# Patient Record
Sex: FEMALE | Race: WHITE | Employment: UNEMPLOYED | URBAN - METROPOLITAN AREA
[De-identification: names, ages, dates, MRNs, and addresses within clinical notes are randomized per-mention and may not be internally consistent; named-entity substitution may affect disease eponyms.]

---

## 2019-10-11 LAB
HBSAG, EXTERNAL: NORMAL
HIV, EXTERNAL: NORMAL
RPR, EXTERNAL: NORMAL
RUBELLA, EXTERNAL: NORMAL

## 2020-02-28 LAB — GRBS, EXTERNAL: NEGATIVE

## 2020-03-30 ENCOUNTER — HOSPITAL ENCOUNTER (INPATIENT)
Age: 25
LOS: 2 days | Discharge: HOME | End: 2020-04-01
Attending: OBSTETRICS & GYNECOLOGY | Admitting: OBSTETRICS & GYNECOLOGY
Payer: COMMERCIAL

## 2020-03-30 DIAGNOSIS — Z34.90 ENCOUNTER FOR PLANNED INDUCTION OF LABOR: Primary | ICD-10-CM

## 2020-03-30 PROBLEM — Z3A.40 40 WEEKS GESTATION OF PREGNANCY: Status: ACTIVE | Noted: 2020-03-30

## 2020-03-30 PROBLEM — O13.3 GESTATIONAL HTN, THIRD TRIMESTER: Status: ACTIVE | Noted: 2020-03-30

## 2020-03-30 LAB
ABO + RH BLD: NORMAL
BLOOD GROUP ANTIBODIES SERPL: NORMAL
ERYTHROCYTE [DISTWIDTH] IN BLOOD BY AUTOMATED COUNT: 12.7 % (ref 11.9–14.6)
HCT VFR BLD AUTO: 35.1 % (ref 35.8–46.3)
HGB BLD-MCNC: 11.6 G/DL (ref 11.7–15.4)
MCH RBC QN AUTO: 29.5 PG (ref 26.1–32.9)
MCHC RBC AUTO-ENTMCNC: 33 G/DL (ref 31.4–35)
MCV RBC AUTO: 89.3 FL (ref 79.6–97.8)
NRBC # BLD: 0 K/UL (ref 0–0.2)
PLATELET # BLD AUTO: 173 K/UL (ref 150–450)
PMV BLD AUTO: 10.2 FL (ref 9.4–12.3)
RBC # BLD AUTO: 3.93 M/UL (ref 4.05–5.2)
SPECIMEN EXP DATE BLD: NORMAL
WBC # BLD AUTO: 13.7 K/UL (ref 4.3–11.1)

## 2020-03-30 PROCEDURE — 85027 COMPLETE CBC AUTOMATED: CPT

## 2020-03-30 PROCEDURE — 65270000029 HC RM PRIVATE

## 2020-03-30 PROCEDURE — 86900 BLOOD TYPING SEROLOGIC ABO: CPT

## 2020-03-30 RX ORDER — LIDOCAINE HYDROCHLORIDE 10 MG/ML
1 INJECTION INFILTRATION; PERINEURAL
Status: DISCONTINUED | OUTPATIENT
Start: 2020-03-30 | End: 2020-03-31 | Stop reason: HOSPADM

## 2020-03-30 RX ORDER — DEXTROSE, SODIUM CHLORIDE, SODIUM LACTATE, POTASSIUM CHLORIDE, AND CALCIUM CHLORIDE 5; .6; .31; .03; .02 G/100ML; G/100ML; G/100ML; G/100ML; G/100ML
125 INJECTION, SOLUTION INTRAVENOUS CONTINUOUS
Status: DISCONTINUED | OUTPATIENT
Start: 2020-03-30 | End: 2020-03-31

## 2020-03-30 RX ORDER — OXYTOCIN/RINGER'S LACTATE 30/500 ML
250 PLASTIC BAG, INJECTION (ML) INTRAVENOUS ONCE
Status: ACTIVE | OUTPATIENT
Start: 2020-03-30 | End: 2020-03-31

## 2020-03-30 RX ORDER — LIDOCAINE HYDROCHLORIDE 20 MG/ML
JELLY TOPICAL
Status: DISCONTINUED | OUTPATIENT
Start: 2020-03-30 | End: 2020-03-31 | Stop reason: HOSPADM

## 2020-03-30 RX ORDER — SODIUM CHLORIDE 0.9 % (FLUSH) 0.9 %
5-40 SYRINGE (ML) INJECTION EVERY 8 HOURS
Status: DISCONTINUED | OUTPATIENT
Start: 2020-03-30 | End: 2020-03-31

## 2020-03-30 RX ORDER — SODIUM CHLORIDE 0.9 % (FLUSH) 0.9 %
5-40 SYRINGE (ML) INJECTION AS NEEDED
Status: DISCONTINUED | OUTPATIENT
Start: 2020-03-30 | End: 2020-03-31

## 2020-03-30 RX ORDER — OXYTOCIN/RINGER'S LACTATE 30/500 ML
0-25 PLASTIC BAG, INJECTION (ML) INTRAVENOUS
Status: DISCONTINUED | OUTPATIENT
Start: 2020-03-30 | End: 2020-03-31 | Stop reason: HOSPADM

## 2020-03-30 RX ORDER — BUTORPHANOL TARTRATE 2 MG/ML
1 INJECTION INTRAMUSCULAR; INTRAVENOUS
Status: DISCONTINUED | OUTPATIENT
Start: 2020-03-30 | End: 2020-03-31 | Stop reason: HOSPADM

## 2020-03-30 RX ORDER — MINERAL OIL
120 OIL (ML) ORAL
Status: COMPLETED | OUTPATIENT
Start: 2020-03-30 | End: 2020-03-31

## 2020-03-31 ENCOUNTER — ANESTHESIA EVENT (OUTPATIENT)
Dept: LABOR AND DELIVERY | Age: 25
End: 2020-03-31
Payer: COMMERCIAL

## 2020-03-31 ENCOUNTER — ANESTHESIA (OUTPATIENT)
Dept: LABOR AND DELIVERY | Age: 25
End: 2020-03-31
Payer: COMMERCIAL

## 2020-03-31 PROCEDURE — 74011250637 HC RX REV CODE- 250/637: Performed by: OBSTETRICS & GYNECOLOGY

## 2020-03-31 PROCEDURE — 00HU33Z INSERTION OF INFUSION DEVICE INTO SPINAL CANAL, PERCUTANEOUS APPROACH: ICD-10-PCS | Performed by: ANESTHESIOLOGY

## 2020-03-31 PROCEDURE — 77030018846 HC SOL IRR STRL H20 ICUM -A

## 2020-03-31 PROCEDURE — 76060000078 HC EPIDURAL ANESTHESIA

## 2020-03-31 PROCEDURE — 77030003028 HC SUT VCRL J&J -A

## 2020-03-31 PROCEDURE — 75410000000 HC DELIVERY VAGINAL/SINGLE

## 2020-03-31 PROCEDURE — 74011250636 HC RX REV CODE- 250/636: Performed by: NURSE ANESTHETIST, CERTIFIED REGISTERED

## 2020-03-31 PROCEDURE — 65270000029 HC RM PRIVATE

## 2020-03-31 PROCEDURE — 3E033VJ INTRODUCTION OF OTHER HORMONE INTO PERIPHERAL VEIN, PERCUTANEOUS APPROACH: ICD-10-PCS | Performed by: OBSTETRICS & GYNECOLOGY

## 2020-03-31 PROCEDURE — 75410000002 HC LABOR FEE PER 1 HR

## 2020-03-31 PROCEDURE — 74011250636 HC RX REV CODE- 250/636: Performed by: OBSTETRICS & GYNECOLOGY

## 2020-03-31 PROCEDURE — 77030011943

## 2020-03-31 PROCEDURE — 77030014125 HC TY EPDRL BBMI -B: Performed by: ANESTHESIOLOGY

## 2020-03-31 PROCEDURE — A4300 CATH IMPL VASC ACCESS PORTAL: HCPCS | Performed by: ANESTHESIOLOGY

## 2020-03-31 PROCEDURE — 75410000003 HC RECOV DEL/VAG/CSECN EA 0.5 HR

## 2020-03-31 PROCEDURE — 0KQM0ZZ REPAIR PERINEUM MUSCLE, OPEN APPROACH: ICD-10-PCS | Performed by: OBSTETRICS & GYNECOLOGY

## 2020-03-31 PROCEDURE — 77030005537 HC CATH URETH BARD -A

## 2020-03-31 RX ORDER — PRENATAL VIT 96/IRON FUM/FOLIC 27MG-0.8MG
1 TABLET ORAL DAILY
Status: DISCONTINUED | OUTPATIENT
Start: 2020-03-31 | End: 2020-04-01 | Stop reason: HOSPADM

## 2020-03-31 RX ORDER — IBUPROFEN 800 MG/1
800 TABLET ORAL EVERY 6 HOURS
Status: DISCONTINUED | OUTPATIENT
Start: 2020-03-31 | End: 2020-04-01 | Stop reason: HOSPADM

## 2020-03-31 RX ORDER — ROPIVACAINE HYDROCHLORIDE 2 MG/ML
INJECTION, SOLUTION EPIDURAL; INFILTRATION; PERINEURAL
Status: DISCONTINUED | OUTPATIENT
Start: 2020-03-31 | End: 2020-03-31 | Stop reason: HOSPADM

## 2020-03-31 RX ORDER — SIMETHICONE 80 MG
80 TABLET,CHEWABLE ORAL
Status: DISCONTINUED | OUTPATIENT
Start: 2020-03-31 | End: 2020-04-01 | Stop reason: HOSPADM

## 2020-03-31 RX ORDER — DOCUSATE SODIUM 100 MG/1
100 CAPSULE, LIQUID FILLED ORAL 2 TIMES DAILY
Status: DISCONTINUED | OUTPATIENT
Start: 2020-03-31 | End: 2020-04-01 | Stop reason: HOSPADM

## 2020-03-31 RX ORDER — ONDANSETRON 4 MG/1
4 TABLET, ORALLY DISINTEGRATING ORAL
Status: ACTIVE | OUTPATIENT
Start: 2020-03-31 | End: 2020-04-01

## 2020-03-31 RX ORDER — ACETAMINOPHEN 325 MG/1
650 TABLET ORAL
Status: DISCONTINUED | OUTPATIENT
Start: 2020-03-31 | End: 2020-03-31

## 2020-03-31 RX ORDER — DIPHENHYDRAMINE HCL 25 MG
25 CAPSULE ORAL
Status: DISCONTINUED | OUTPATIENT
Start: 2020-03-31 | End: 2020-04-01 | Stop reason: HOSPADM

## 2020-03-31 RX ORDER — ACETAMINOPHEN 500 MG
1000 TABLET ORAL EVERY 6 HOURS
Status: DISCONTINUED | OUTPATIENT
Start: 2020-03-31 | End: 2020-04-01 | Stop reason: HOSPADM

## 2020-03-31 RX ORDER — NALOXONE HYDROCHLORIDE 0.4 MG/ML
0.4 INJECTION, SOLUTION INTRAMUSCULAR; INTRAVENOUS; SUBCUTANEOUS AS NEEDED
Status: DISCONTINUED | OUTPATIENT
Start: 2020-03-31 | End: 2020-04-01 | Stop reason: HOSPADM

## 2020-03-31 RX ORDER — OXYCODONE HYDROCHLORIDE 5 MG/1
5 TABLET ORAL
Status: DISCONTINUED | OUTPATIENT
Start: 2020-03-31 | End: 2020-04-01 | Stop reason: HOSPADM

## 2020-03-31 RX ADMIN — ACETAMINOPHEN 1000 MG: 500 TABLET, FILM COATED ORAL at 21:01

## 2020-03-31 RX ADMIN — DOCUSATE SODIUM 100 MG: 100 CAPSULE, LIQUID FILLED ORAL at 18:15

## 2020-03-31 RX ADMIN — ROPIVACAINE HYDROCHLORIDE 8 ML/HR: 2 INJECTION, SOLUTION EPIDURAL; INFILTRATION at 03:07

## 2020-03-31 RX ADMIN — Medication 2 MILLI-UNITS/MIN: at 05:01

## 2020-03-31 RX ADMIN — ACETAMINOPHEN 650 MG: 325 TABLET, FILM COATED ORAL at 06:52

## 2020-03-31 RX ADMIN — ACETAMINOPHEN 1000 MG: 500 TABLET, FILM COATED ORAL at 15:02

## 2020-03-31 RX ADMIN — MINERAL OIL 120 ML: 471.95 OIL ORAL at 03:56

## 2020-03-31 RX ADMIN — DOCUSATE SODIUM 100 MG: 100 CAPSULE, LIQUID FILLED ORAL at 12:16

## 2020-03-31 RX ADMIN — Medication 250 MILLI-UNITS/MIN: at 08:12

## 2020-03-31 RX ADMIN — IBUPROFEN 800 MG: 800 TABLET ORAL at 12:16

## 2020-03-31 RX ADMIN — IBUPROFEN 800 MG: 800 TABLET ORAL at 18:15

## 2020-03-31 RX ADMIN — SODIUM CHLORIDE, SODIUM LACTATE, POTASSIUM CHLORIDE, CALCIUM CHLORIDE, AND DEXTROSE MONOHYDRATE 125 ML/HR: 600; 310; 30; 20; 5 INJECTION, SOLUTION INTRAVENOUS at 05:01

## 2020-03-31 NOTE — LACTATION NOTE

## 2020-03-31 NOTE — PROGRESS NOTES
Safety Teaching reviewed:   1. Hand hygiene prior to handling the infant. 2. Use of bulb syringe  3. Bracelets with matching numbers are placed on mother and infant  3. An infant security tag  [facility]) is placed on the infant's ankle and monitored  5. All OB nurses wear pink Employee badges - do not give your baby to anyone without proper identification. 6. Never leave the baby alone in the room. 7. The infant should be placed on their back to sleep. on a firm mattress. No toys should be placed in the crib. (safe sleep video offered to view)  8. Never shake the baby (video offered to view)  9. Infant fall prevention - do not sleep with the baby, and place the baby in the crib while ambulating. 8. Mother and Baby Care booklet given to Mother.

## 2020-03-31 NOTE — PROGRESS NOTES
Phone call to Dr. Bev Bailey regarding patients status. Advised patient with temp of 100.1, Fetal tachycardia noted and fetal position with pushing. Per Dr. Bev Bailey patient to have Tylenol for increased temp and to continue with pushing.

## 2020-03-31 NOTE — PROGRESS NOTES
SBAR OUT Report: Mother    Verbal report given to Celestine Bobo RN on this patient, who is now being transferred to [facility] for routine progression of care. The patient is not wearing a green \"Anesthesia-Duramorph\" band. Report consisted of patient's Situation, Background, Assessment and Recommendations (SBAR). [place] ID bands were compared with the identification form, and verified with the patient and receiving nurse. Information from the SBAR, Procedure Summary, Intake/Output, MAR and Recent Results and the Federico Report was reviewed with the receiving nurse; opportunity for questions and clarification provided.

## 2020-03-31 NOTE — ANESTHESIA PROCEDURE NOTES
Epidural Block    Start time: 3/31/2020 2:58 AM  End time: 3/31/2020 3:02 AM  Performed by: James Cyr MD  Authorized by: James Cyr MD     Pre-Procedure  Indication: at surgeon's request, post-op pain management, procedure for pain and labor epidural    Preanesthetic Checklist: patient identified, risks and benefits discussed, anesthesia consent, site marked, patient being monitored, timeout performed and anesthesia consent    Timeout Time: 02:58        Epidural:   Patient position:  Seated  Prep region:  Lumbar  Prep: Chlorhexidine    Location:  L3-4    Needle and Epidural Catheter:   Needle Type:  Tuohy  Needle Gauge:  17 G  Injection Technique:  Loss of resistance using saline  Attempts:  1  Catheter Size:  19 G  Catheter at Skin Depth (cm):  10  Depth in Epidural Space (cm):  5  Events: no blood with aspiration, no cerebrospinal fluid with aspiration, no paresthesia and negative aspiration test    Test Dose:  Lidocaine 1.5% w/ epi and negative    Assessment:   Catheter Secured:  Tegaderm and tape  Insertion:  Uncomplicated  Patient tolerance:  Patient tolerated the procedure well with no immediate complications

## 2020-03-31 NOTE — PROGRESS NOTES
SBAR IN Report: Mother    Verbal report received from [facility] on this patient, who is now being transferred from L&D (unit) for routine progression of care. The patient is not wearing a green \"Anesthesia-Duramorph\" band. Report consisted of patient's Situation, Background, Assessment and Recommendations (SBAR). [place] ID bands were compared with the identification form, and verified with the patient and transferring nurse. Information from the SBAR and the Federico Report was reviewed with the transferring nurse; opportunity for questions and clarification provided.

## 2020-03-31 NOTE — H&P
History & Physical    Name: Matt Mariano MRN: [id number]  SSN: [id number]    YOB: 1995  Age: 22 y.o. Sex: female      Subjective:     Estimated Date of Delivery: 3/25/20  OB History    Para Term  AB Living   1             SAB TAB Ectopic Molar Multiple Live Births                    # Outcome Date GA Lbr Tato/2nd Weight Sex Delivery Anes PTL Lv   1 Current                Ms. Oral Schulz is admitted with pregnancy at 40w6d for induction of labor due to gestational HTN. Prenatal course was normal.  Please see prenatal records for details. Past Medical History:   Diagnosis Date    Gestational hypertension      No past surgical history on file. Social History     Occupational History    Not on file   Tobacco Use    Smoking status: Never Smoker   Substance and Sexual Activity    Alcohol use: Not Currently    Drug use: Not on file    Sexual activity: Not on file     No family history on file. No Known Allergies  Prior to Admission medications    Medication Sig Start Date End Date Taking? Authorizing Provider   PNV66-Iron Fumarate-FA-DSS-DHA 26-1.2- mg cap Take 1 Tab by mouth daily. Yes Provider, Historical        Review of Systems: A comprehensive review of systems was negative except for that written in the History of Present Illness. Objective:     Vitals:  Vitals:    20 0332 20 0337 20 0344 20 0347   BP: 144/70 142/72 141/78 140/80   Pulse: 72 73 71 73   Resp:       Temp:       Weight:            Physical Exam:  Patient without distress. Heart: Regular rate and rhythm  Lung: clear to auscultation throughout lung fields, no wheezes, no rales, no rhonchi and normal respiratory effort  Abdomen: soft, nontender  Fundus: soft and non tender  Membranes:  Spontaneous Rupture of Membranes;  Amniotic Fluid: thin meconium fluid  Fetal Heart Rate: Reactive          Prenatal Labs:   Lab Results   Component Value Date/Time    ABO/Rh(D) O POSITIVE 2020 07:48 PM    Rubella, External Immune 10/11/2019    HBsAg, External Non-Reactive 10/11/2019    HIV, External Non-Reactive 10/11/2019    RPR, External Non-Reactive 10/11/2019    GrBStrep, External Negative 02/28/2020       Impression/Plan:     Active Problems:    40 weeks gestation of pregnancy (3/30/2020)      Gestational HTN, third trimester (3/30/2020)      Encounter for planned induction of labor (3/30/2020)         Plan: Admit for induction of labor. Group B Strep negative.

## 2020-03-31 NOTE — PROGRESS NOTES
Eduardo Nettles at bedside at [phone]. JACKIE Louvella Bolus at bedside at [phone]    Assisted pt to sitting up on bedside at 0252. Timeout completed at [phone] with MD, JACKIE and myself at bedside. Test dose given at 0259. Negative reaction. Dose given at [facility]. Pt assisted to lying back in left tilt position. See anesthesia record for details. See vital sign flow sheet for BP. Tolerated procedure well.

## 2020-03-31 NOTE — PROGRESS NOTES
Phone call to Dr. Violeta Cole to inform of patient SVE of 8/100/-1. Advised will call when patient is complete.

## 2020-03-31 NOTE — PROGRESS NOTES
Phone call to Dr. Hieu Gonzalez regarding patient cervical exam results of 3-4 cm/ 80 and patient Bps. Per Dr. Hieu Gonzalez patient should not be given Cytotec as previously planned and no change with regular progression of care despite elevated Bps. Patient denies headache, blurred vision, no visual swelling noted. . Patient to be monitored through the night and if no change at 0500 patient to be started on Pitocin. Per MD if patient would like to have Ambien she may. Patient and  advised of plan and verbalized understanding.

## 2020-03-31 NOTE — LACTATION NOTE
In to see mom and infant for the first time. Infant was asleep skin to skin on mom's chest. Mom stated that infant latched and sucked briefly twice. Reviewed the expectations of the first 24 hours. Mom to call out when infant awake and cueing.  Otherwise lactation consultant will follow up in am.

## 2020-03-31 NOTE — ANESTHESIA PREPROCEDURE EVALUATION
Relevant Problems   No relevant active problems       Anesthetic History   No history of anesthetic complications            Review of Systems / Medical History  Patient summary reviewed and pertinent labs reviewed    Pulmonary  Within defined limits                 Neuro/Psych   Within defined limits           Cardiovascular    Hypertension (Gestational)              Exercise tolerance: >4 METS     GI/Hepatic/Renal  Within defined limits              Endo/Other  Within defined limits           Other Findings              Physical Exam    Airway  Mallampati: II  TM Distance: 4 - 6 cm  Neck ROM: normal range of motion        Cardiovascular    Rhythm: regular  Rate: normal         Dental  No notable dental hx       Pulmonary  Breath sounds clear to auscultation               Abdominal  GI exam deferred       Other Findings            Anesthetic Plan    ASA: 2  Anesthesia type: epidural          Induction: Intravenous  Anesthetic plan and risks discussed with: Patient

## 2020-04-01 VITALS
OXYGEN SATURATION: 97 % | DIASTOLIC BLOOD PRESSURE: 77 MMHG | SYSTOLIC BLOOD PRESSURE: 128 MMHG | TEMPERATURE: 98.1 F | HEART RATE: 72 BPM | RESPIRATION RATE: 16 BRPM | WEIGHT: 165 LBS

## 2020-04-01 PROCEDURE — 74011250637 HC RX REV CODE- 250/637: Performed by: OBSTETRICS & GYNECOLOGY

## 2020-04-01 RX ORDER — IBUPROFEN 800 MG/1
800 TABLET ORAL EVERY 6 HOURS
Qty: 60 TAB | Refills: 1 | Status: SHIPPED | OUTPATIENT
Start: 2020-04-01 | End: 2021-05-10

## 2020-04-01 RX ORDER — OXYCODONE HYDROCHLORIDE 5 MG/1
5 TABLET ORAL
Qty: 30 TAB | Refills: 0 | Status: SHIPPED | OUTPATIENT
Start: 2020-04-01 | End: 2020-04-04

## 2020-04-01 RX ADMIN — IBUPROFEN 800 MG: 800 TABLET ORAL at 12:43

## 2020-04-01 RX ADMIN — IBUPROFEN 800 MG: 800 TABLET ORAL at 00:55

## 2020-04-01 RX ADMIN — DOCUSATE SODIUM 100 MG: 100 CAPSULE, LIQUID FILLED ORAL at 08:46

## 2020-04-01 RX ADMIN — ACETAMINOPHEN 1000 MG: 500 TABLET, FILM COATED ORAL at 03:15

## 2020-04-01 RX ADMIN — ACETAMINOPHEN 1000 MG: 500 TABLET, FILM COATED ORAL at 08:46

## 2020-04-01 RX ADMIN — IBUPROFEN 800 MG: 800 TABLET ORAL at 06:07

## 2020-04-01 NOTE — PROGRESS NOTES
Chart reviewed - first time parent. No PCP. Phone call into patient's room due to social distancing. Introduction made as hospital ; patient agreeable to continue conversation.  provided education on [facility] Postpartum [place] [facility] Visit. Family undecided on need for contact from [facility] at this time. Patient will contact  if she would like to participate in [facility] program.     Patient denied any additional needs at this time. Patient has this 's contact information should any needs/questions arise. Informational packet on  mood/anxiety disorders (education/resources), brochure on [facility] program, and PCP list provided to RN to give to patient.     NIKKI West  [address] [place] [address]   [phone]

## 2020-04-01 NOTE — LACTATION NOTE
Early discharge. Mom and baby are going home today. Continue to offer the breast without restriction. Mom's milk should be fully in over the next few days. Reviewed engorgement precautions. Hand Expression has been demoed and written hand-out reviewed. As milk comes in baby will be more alert at the breast and swallows will be more obvious. Breasts may feel softer once baby has finished nursing. Baby should be back to birth weight by 3weeks of age. And then gain on average 1 oz per day for the next 2-3 months. Reviewed babies should be exclusively breastfeeding for the first 6 months and that breastfeeding should continue after introduction of appropriate complimentary foods after 6 months. Initial output should be at least 1 wet and 1 bowel movement for each day old baby is. By day 5-7 once milk is fully in baby will consistently have 6 or more soaking wet diapers and about 4 bowel movement. Some babies have a bowel movement with every feeding and some have 1-3 large bowel movements each day. Inadequate output may indicate inadequate feedings and should be reported to your Pediatrician. Bowel habits may change as baby gets older. Encouraged follow-up at Pediatrician in 1-2 days, no later than 1 week of age. Call [facility] for any questions as needed or to set up an OP visit. OP phone calls are returned within 24 hours. Community Breastfeeding Resource List given.

## 2020-04-01 NOTE — DISCHARGE INSTRUCTIONS
Patient Education   Discharge instruction to follow: Activity: Pelvis rest for 6 weeks, nothing in the vagina     No heavy lifting over 15 lbs or strenuous exercise for 6 weeks     No push/pull motion such as sweeping or vacuuming for 6 weeks     No tub baths for 6 weeks      If using raquel-bottle continue to use until comfortable stopping. Change sanitary pad after each urination or bowel movement. Call MD for the following:      Fever over 100.4 F; pain not relieved by medication; foul smelling vaginal discharge or an increase in vaginal bleeding. Take medication as prescribed. Follow up with MD as order. After Your Delivery (the Postpartum Period): Care Instructions  Your Care Instructions    Congratulations on the birth of your baby. Like pregnancy, the  period can be a time of excitement, cindy, and exhaustion. You may look at your wondrous little baby and feel happy. You may also be overwhelmed by your new sleep hours and new responsibilities. At first, babies often sleep during the days and are awake at night. They do not have a pattern or routine. They may make sudden gasps, jerk themselves awake, or look like they have crossed eyes. These are all normal, and they may even make you smile. In these first weeks after delivery, try to take good care of yourself. It may take 4 to 6 weeks to feel like yourself again, and possibly longer if you had a  birth. You will likely feel very tired for several weeks. Your days will be full of ups and downs, but lots of cindy as well. Follow-up care is a key part of your treatment and safety. Be sure to make and go to all appointments, and call your doctor if you are having problems. It's also a good idea to know your test results and keep a list of the medicines you take. How can you care for yourself at home? Take care of your body after delivery  · Use pads instead of tampons for the bloody flow that may last as long as 2 weeks.   · Ease cramps with ibuprofen (Advil, Motrin). · Ease soreness of hemorrhoids and the area between your vagina and rectum with ice compresses or witch hazel pads. · Ease constipation by drinking lots of fluid and eating high-fiber foods. Ask your doctor about over-the-counter stool softeners. · Cleanse yourself with a gentle squeeze of warm water from a bottle instead of wiping with toilet paper. · Take a sitz bath in warm water several times a day. · Wear a good nursing bra. Ease sore and swollen breasts with warm, wet washcloths. · If you are not breastfeeding, use ice rather than heat for breast soreness. · Your period may not start for several months if you are breastfeeding. You may bleed more, and longer at first, than you did before you got pregnant. · Wait until you are healed (about 4 to 6 weeks) before you have sexual intercourse. Your doctor will tell you when it is okay to have sex. · Try not to travel with your baby for 5 or 6 weeks. If you take a long car trip, make frequent stops to walk around and stretch. Avoid exhaustion  · Rest every day. Try to nap when your baby naps. · Ask another adult to be with you for a few days after delivery. · Plan for  if you have other children. · Stay flexible so you can eat at odd hours and sleep when you need to. Both you and your baby are making new schedules. · Plan small trips to get out of the house. Change can make you feel less tired. · Ask for help with housework, cooking, and shopping. Remind yourself that your job is to care for your baby. Know about help for postpartum depression  · \"Baby blues\" are common for the first 1 to 2 weeks after birth. You may cry or feel sad or irritable for no reason. · Rest whenever you can. Being tired makes it harder to handle your emotions. · Go for walks with your baby. · Talk to your partner, friends, and family about your feelings.   · If your symptoms last for more than a few weeks, or if you feel very depressed, ask your doctor for help. · Postpartum depression can be treated. Support groups and counseling can help. Sometimes medicine can also help. Stay healthy  · Eat healthy foods so you have more energy and lose extra baby pounds. · If you breastfeed, avoid drugs. If you quit smoking during pregnancy, try to stay smoke-free. If you choose to have a drink now and then, have only one drink, and limit the number of occasions that you have a drink. Wait to breastfeed at least 2 hours after you have a drink to reduce the amount of alcohol the baby may get in the milk. · Start daily exercise after 4 to 6 weeks, but rest when you feel tired. · Learn exercises to tone your belly. Do Kegel exercises to regain strength in your pelvic muscles. You can do these exercises while you stand or sit. ? Squeeze the same muscles you would use to stop your urine. Your belly and thighs should not move. ? Hold the squeeze for 3 seconds, and then relax for 3 seconds. ? Start with 3 seconds. Then add 1 second each week until you are able to squeeze for 10 seconds. ? Repeat the exercise 10 to 15 times for each session. Do three or more sessions each day. · Find a class for new mothers and new babies that has an exercise time. · If you had a  birth, give yourself a bit more time before you exercise, and be careful. When should you call for help? Call  911 anytime you think you may need emergency care. For example, call if:    · You have thoughts of harming yourself, your baby, or another person.     · You passed out (lost consciousness).     · You have chest pain, are short of breath, or cough up blood.     · You have a seizure.    Call your doctor now or seek immediate medical care if:    · You have severe vaginal bleeding. This means you are passing blood clots and soaking through a pad each hour for 2 or more hours.     · You are dizzy or lightheaded, or you feel like you may faint.     · You have a fever.   · You have new or more belly pain.     · You have signs of a blood clot in your leg (called a deep vein thrombosis), such as:  ? Pain in the calf, back of the knee, thigh, or groin. ? Redness and swelling in your leg or groin.     · You have signs of preeclampsia, such as:  ? Sudden swelling of your face, hands, or feet. ? New vision problems (such as dimness, blurring, or seeing spots). ? A severe headache.    Watch closely for changes in your health, and be sure to contact your doctor if:    · Your vaginal bleeding seems to be getting heavier.     · You have new or worse vaginal discharge.     · You feel sad, anxious, or hopeless for more than a few days.     · You do not get better as expected.

## 2020-04-01 NOTE — DISCHARGE SUMMARY
Obstetrical Discharge Summary     Name: Matteo Ritter MRN: [id number]  SSN: [id number]    YOB: 1995  Age: 22 y.o. Sex: female      Allergies: Patient has no known allergies. Admit Date: 3/30/2020    Discharge Date: 2020     Admitting Physician: Fátima Coronado MD     Attending Physician:  Tonya Ayala MD     * Admission Diagnoses: 40 weeks gestation of pregnancy [Z3A.40]  Gestational HTN, third trimester [O13.3]  Encounter for planned induction of labor [Z34.90]    * Discharge Diagnoses:   Information for the patient's :  Nidhi Chaudhari [[id number]]   Delivery of a 3.74 kg male infant via Vaginal, Vacuum (Extractor) on 3/31/2020 at 7:45 AM  by Fátima Coronado. Apgars were 8  and 9 . Additional Diagnoses:   Hospital Problems as of 2020 Never Reviewed          Codes Class Noted - Resolved POA    40 weeks gestation of pregnancy ICD-10-CM: Z3A.40  ICD-9-CM: V22.2  3/30/2020 - Present Unknown        Gestational HTN, third trimester ICD-10-CM: O13.3  ICD-9-CM: 642.33  3/30/2020 - Present Unknown        Encounter for planned induction of labor ICD-10-CM: Z34.90  ICD-9-CM: V22.1  3/30/2020 - Present Unknown             Lab Results   Component Value Date/Time    ABO/Rh(D) O POSITIVE 2020 07:48 PM    Rubella, External Immune 10/11/2019    GrBStrep, External Negative 2020    There is no immunization history for the selected administration types on file for this patient. * Procedures: vacuum delivery  * No surgery found *           * Discharge Condition: good    [facility] Course: Normal hospital course following the delivery. * Disposition: Home    Discharge Medications:   Current Discharge Medication List      START taking these medications    Details   ibuprofen (MOTRIN) 800 mg tablet Take 1 Tab by mouth every six (6) hours.   Qty: 60 Tab, Refills: 1      oxyCODONE IR (ROXICODONE) 5 mg immediate release tablet Take 1 Tab by mouth every four (4) hours as needed for Pain for up to 3 days. Max Daily Amount: 30 mg.  Qty: 30 Tab, Refills: 0    Associated Diagnoses: Encounter for planned induction of labor         CONTINUE these medications which have NOT CHANGED    Details   PNV66-Iron Fumarate-FA-DSS-DHA 26-1.2- mg cap Take 1 Tab by mouth daily. * Follow-up Care/Patient Instructions:   Activity: Activity as tolerated and No heavy lifting for 6 weeks  Diet: Regular Diet  Wound Care: Keep wound clean and dry    Follow-up Information     Follow up With Specialties Details Why Contact Info    Other, Phys, MD    Patient can only remember the practice name and not the physician

## 2020-04-01 NOTE — LACTATION NOTE
Lactation visit. In to check on feeds. Mom requesting early discharge. Have been keeping a good feeding log. Baby feeding at the breast every 2-3 hours and good output. Was latched onto right breast in cradle hold when [facility] entered room. Reviewed signs of good latch with mom and showed manual lip flange. Baby latching well, stays on well and actively feeds with stimulation. Nipple round and everted on release. Mom mostly doing one sided feeds so encouraged mom to offer the other breast at all feeds. Assisted mom in football hold on left breast. Reviewed supportive technique. Baby eager at the breast and latched with no issues. Again, good latch and stays on breast well. Reviewed feeding on demand. Wake if needed for 8 feeds in 24 hours. Discussed cluster feeding. Watch output closely. All questions answered.

## 2020-04-01 NOTE — ANESTHESIA POSTPROCEDURE EVALUATION
* No procedures listed *. No value filed. Anesthesia Post Evaluation      Multimodal analgesia: multimodal analgesia used between 6 hours prior to anesthesia start to PACU discharge  Patient location during evaluation: PACU  Patient participation: complete - patient participated  Level of consciousness: awake and alert  Pain management: adequate  Airway patency: patent  Anesthetic complications: no  Cardiovascular status: acceptable and hemodynamically stable  Respiratory status: acceptable  Hydration status: acceptable  Comments: Patient does state she has a small patchy area of \"numbness\" on the top of her left thigh. Denies any motor weakness. Uncomplicated epidural placement but 3.5 hours of pushing. I explained patient likely has a temporary sensory deficit which should resolve itself. Patient counseled on events that would prompt her to reach out to us for additional help        No vitals data found for the desired time range.

## 2021-05-10 PROBLEM — Z3A.23 23 WEEKS GESTATION OF PREGNANCY: Status: ACTIVE | Noted: 2021-05-10

## 2021-05-10 PROBLEM — O13.3 GESTATIONAL HTN, THIRD TRIMESTER: Status: RESOLVED | Noted: 2020-03-30 | Resolved: 2021-05-10

## 2021-05-10 PROBLEM — Z34.90 ENCOUNTER FOR PLANNED INDUCTION OF LABOR: Status: RESOLVED | Noted: 2020-03-30 | Resolved: 2021-05-10

## 2021-05-10 PROBLEM — O09.899 TWO VESSEL UMBILICAL CORD, ANTEPARTUM, SINGLE GESTATION: Status: ACTIVE | Noted: 2021-05-10

## 2021-05-10 PROBLEM — Z3A.40 40 WEEKS GESTATION OF PREGNANCY: Status: RESOLVED | Noted: 2020-03-30 | Resolved: 2021-05-10

## 2021-05-10 PROBLEM — O35.EXX0 FETAL HYDRONEPHROSIS DURING PREGNANCY, ANTEPARTUM: Status: ACTIVE | Noted: 2021-05-10

## 2021-05-12 PROBLEM — Z3A.23 23 WEEKS GESTATION OF PREGNANCY: Status: RESOLVED | Noted: 2021-05-10 | Resolved: 2021-05-12

## 2021-05-12 PROBLEM — O40.2XX0 POLYHYDRAMNIOS IN SECOND TRIMESTER: Status: ACTIVE | Noted: 2021-05-12

## 2021-05-12 PROBLEM — O09.92 HIGH-RISK PREGNANCY IN SECOND TRIMESTER: Status: ACTIVE | Noted: 2021-05-12

## 2021-05-12 PROBLEM — O36.62X0 EXCESSIVE FETAL GROWTH AFFECTING MANAGEMENT OF PREGNANCY IN SECOND TRIMESTER: Status: ACTIVE | Noted: 2021-05-12

## 2021-05-12 PROBLEM — O35.EXX0 FETAL HYDRONEPHROSIS DURING PREGNANCY, ANTEPARTUM: Status: RESOLVED | Noted: 2021-05-10 | Resolved: 2021-05-12

## 2021-06-08 PROBLEM — O24.419 GESTATIONAL DIABETES MELLITUS (GDM) IN SECOND TRIMESTER: Status: ACTIVE | Noted: 2021-06-08

## 2021-07-09 PROBLEM — Z3A.31 31 WEEKS GESTATION OF PREGNANCY: Status: ACTIVE | Noted: 2021-07-09

## 2021-07-09 PROBLEM — O40.3XX0 POLYHYDRAMNIOS IN THIRD TRIMESTER: Status: ACTIVE | Noted: 2021-05-12

## 2021-07-09 PROBLEM — O09.93 HIGH-RISK PREGNANCY IN THIRD TRIMESTER: Status: ACTIVE | Noted: 2021-05-12

## 2021-07-09 PROBLEM — O36.63X0 EXCESSIVE FETAL GROWTH AFFECTING MANAGEMENT OF PREGNANCY IN THIRD TRIMESTER: Status: ACTIVE | Noted: 2021-05-12

## 2021-07-09 PROBLEM — O40.3XX0 POLYHYDRAMNIOS IN THIRD TRIMESTER: Status: RESOLVED | Noted: 2021-05-12 | Resolved: 2021-07-09

## 2021-07-09 PROBLEM — Z3A.31 31 WEEKS GESTATION OF PREGNANCY: Status: RESOLVED | Noted: 2021-07-09 | Resolved: 2021-07-09

## 2021-07-09 PROBLEM — O36.63X0 EXCESSIVE FETAL GROWTH AFFECTING MANAGEMENT OF PREGNANCY IN THIRD TRIMESTER: Status: RESOLVED | Noted: 2021-05-12 | Resolved: 2021-07-09

## 2021-07-31 ENCOUNTER — HOSPITAL ENCOUNTER (INPATIENT)
Age: 26
LOS: 3 days | Discharge: HOME | End: 2021-08-03
Attending: OBSTETRICS & GYNECOLOGY | Admitting: OBSTETRICS & GYNECOLOGY
Payer: COMMERCIAL

## 2021-07-31 PROBLEM — O60.03 PRETERM LABOR IN THIRD TRIMESTER: Status: ACTIVE | Noted: 2021-07-31

## 2021-07-31 LAB
ABO + RH BLD: NORMAL
ALBUMIN SERPL-MCNC: 2.9 G/DL (ref 3.5–5)
ALBUMIN/GLOB SERPL: 0.8 {RATIO} (ref 1.2–3.5)
ALP SERPL-CCNC: 126 U/L (ref 50–130)
ALT SERPL-CCNC: 24 U/L (ref 12–65)
ANION GAP SERPL CALC-SCNC: 7 MMOL/L (ref 7–16)
AST SERPL-CCNC: 36 U/L (ref 15–37)
BILIRUB SERPL-MCNC: 0.4 MG/DL (ref 0.2–1.1)
BLOOD GROUP ANTIBODIES SERPL: NORMAL
BUN SERPL-MCNC: 18 MG/DL (ref 6–23)
CALCIUM SERPL-MCNC: 8.6 MG/DL (ref 8.3–10.4)
CHLORIDE SERPL-SCNC: 109 MMOL/L (ref 98–107)
CO2 SERPL-SCNC: 21 MMOL/L (ref 21–32)
CREAT SERPL-MCNC: 0.57 MG/DL (ref 0.6–1)
ERYTHROCYTE [DISTWIDTH] IN BLOOD BY AUTOMATED COUNT: 13 % (ref 11.9–14.6)
GLOBULIN SER CALC-MCNC: 3.8 G/DL (ref 2.3–3.5)
GLUCOSE SERPL-MCNC: 94 MG/DL (ref 65–100)
HCT VFR BLD AUTO: 40.7 % (ref 35.8–46.3)
HGB BLD-MCNC: 13.1 G/DL (ref 11.7–15.4)
MCH RBC QN AUTO: 29.1 PG (ref 26.1–32.9)
MCHC RBC AUTO-ENTMCNC: 32.2 G/DL (ref 31.4–35)
MCV RBC AUTO: 90.4 FL (ref 79.6–97.8)
NRBC # BLD: 0 K/UL (ref 0–0.2)
PLATELET # BLD AUTO: 133 K/UL (ref 150–450)
PMV BLD AUTO: 12 FL (ref 9.4–12.3)
POTASSIUM SERPL-SCNC: 4.8 MMOL/L (ref 3.5–5.1)
PROT SERPL-MCNC: 6.7 G/DL (ref 6.3–8.2)
RBC # BLD AUTO: 4.5 M/UL (ref 4.05–5.2)
SODIUM SERPL-SCNC: 137 MMOL/L (ref 136–145)
SPECIMEN EXP DATE BLD: NORMAL
WBC # BLD AUTO: 11.1 K/UL (ref 4.3–11.1)

## 2021-07-31 PROCEDURE — 74011250636 HC RX REV CODE- 250/636: Performed by: OBSTETRICS & GYNECOLOGY

## 2021-07-31 PROCEDURE — 74011000250 HC RX REV CODE- 250: Performed by: OBSTETRICS & GYNECOLOGY

## 2021-07-31 PROCEDURE — 80053 COMPREHEN METABOLIC PANEL: CPT

## 2021-07-31 PROCEDURE — 75410000000 HC DELIVERY VAGINAL/SINGLE

## 2021-07-31 PROCEDURE — 65270000029 HC RM PRIVATE

## 2021-07-31 PROCEDURE — 99283 EMERGENCY DEPT VISIT LOW MDM: CPT

## 2021-07-31 PROCEDURE — 75410000002 HC LABOR FEE PER 1 HR

## 2021-07-31 PROCEDURE — 85027 COMPLETE CBC AUTOMATED: CPT

## 2021-07-31 PROCEDURE — 77030003028 HC SUT VCRL J&J -A

## 2021-07-31 PROCEDURE — 2709999900 HC NON-CHARGEABLE SUPPLY

## 2021-07-31 PROCEDURE — 74011250637 HC RX REV CODE- 250/637: Performed by: OBSTETRICS & GYNECOLOGY

## 2021-07-31 PROCEDURE — 0HQ9XZZ REPAIR PERINEUM SKIN, EXTERNAL APPROACH: ICD-10-PCS | Performed by: OBSTETRICS & GYNECOLOGY

## 2021-07-31 PROCEDURE — 75410000003 HC RECOV DEL/VAG/CSECN EA 0.5 HR

## 2021-07-31 PROCEDURE — 74011000258 HC RX REV CODE- 258: Performed by: OBSTETRICS & GYNECOLOGY

## 2021-07-31 PROCEDURE — 86901 BLOOD TYPING SEROLOGIC RH(D): CPT

## 2021-07-31 RX ORDER — SODIUM CHLORIDE 0.9 % (FLUSH) 0.9 %
5-40 SYRINGE (ML) INJECTION AS NEEDED
Status: DISCONTINUED | OUTPATIENT
Start: 2021-07-31 | End: 2021-08-03 | Stop reason: HOSPADM

## 2021-07-31 RX ORDER — MINERAL OIL
120 OIL (ML) ORAL
Status: DISCONTINUED | OUTPATIENT
Start: 2021-07-31 | End: 2021-07-31 | Stop reason: HOSPADM

## 2021-07-31 RX ORDER — SODIUM CHLORIDE 0.9 % (FLUSH) 0.9 %
5-40 SYRINGE (ML) INJECTION EVERY 8 HOURS
Status: DISCONTINUED | OUTPATIENT
Start: 2021-07-31 | End: 2021-08-03 | Stop reason: HOSPADM

## 2021-07-31 RX ORDER — OXYTOCIN/RINGER'S LACTATE 30/500 ML
10 PLASTIC BAG, INJECTION (ML) INTRAVENOUS AS NEEDED
Status: COMPLETED | OUTPATIENT
Start: 2021-07-31 | End: 2021-07-31

## 2021-07-31 RX ORDER — ACETAMINOPHEN 500 MG
1000 TABLET ORAL EVERY 6 HOURS
Status: DISCONTINUED | OUTPATIENT
Start: 2021-07-31 | End: 2021-08-03 | Stop reason: HOSPADM

## 2021-07-31 RX ORDER — BUTORPHANOL TARTRATE 2 MG/ML
1 INJECTION INTRAMUSCULAR; INTRAVENOUS
Status: DISCONTINUED | OUTPATIENT
Start: 2021-07-31 | End: 2021-07-31 | Stop reason: HOSPADM

## 2021-07-31 RX ORDER — ONDANSETRON 4 MG/1
4 TABLET, ORALLY DISINTEGRATING ORAL
Status: DISCONTINUED | OUTPATIENT
Start: 2021-07-31 | End: 2021-08-03 | Stop reason: HOSPADM

## 2021-07-31 RX ORDER — SIMETHICONE 80 MG
80 TABLET,CHEWABLE ORAL
Status: DISCONTINUED | OUTPATIENT
Start: 2021-07-31 | End: 2021-08-03 | Stop reason: HOSPADM

## 2021-07-31 RX ORDER — OXYTOCIN/RINGER'S LACTATE 30/500 ML
87.3 PLASTIC BAG, INJECTION (ML) INTRAVENOUS AS NEEDED
Status: COMPLETED | OUTPATIENT
Start: 2021-07-31 | End: 2021-07-31

## 2021-07-31 RX ORDER — LIDOCAINE HYDROCHLORIDE 10 MG/ML
1 INJECTION INFILTRATION; PERINEURAL
Status: COMPLETED | OUTPATIENT
Start: 2021-07-31 | End: 2021-07-31

## 2021-07-31 RX ORDER — DEXTROSE, SODIUM CHLORIDE, SODIUM LACTATE, POTASSIUM CHLORIDE, AND CALCIUM CHLORIDE 5; .6; .31; .03; .02 G/100ML; G/100ML; G/100ML; G/100ML; G/100ML
125 INJECTION, SOLUTION INTRAVENOUS CONTINUOUS
Status: DISCONTINUED | OUTPATIENT
Start: 2021-07-31 | End: 2021-08-03 | Stop reason: HOSPADM

## 2021-07-31 RX ORDER — NALOXONE HYDROCHLORIDE 0.4 MG/ML
0.4 INJECTION, SOLUTION INTRAMUSCULAR; INTRAVENOUS; SUBCUTANEOUS AS NEEDED
Status: DISCONTINUED | OUTPATIENT
Start: 2021-07-31 | End: 2021-08-03 | Stop reason: HOSPADM

## 2021-07-31 RX ORDER — LIDOCAINE HYDROCHLORIDE 20 MG/ML
JELLY TOPICAL
Status: DISCONTINUED | OUTPATIENT
Start: 2021-07-31 | End: 2021-07-31 | Stop reason: HOSPADM

## 2021-07-31 RX ORDER — POLYETHYLENE GLYCOL 3350 17 G/17G
17 POWDER, FOR SOLUTION ORAL
Status: DISCONTINUED | OUTPATIENT
Start: 2021-07-31 | End: 2021-08-03 | Stop reason: HOSPADM

## 2021-07-31 RX ORDER — IBUPROFEN 800 MG/1
800 TABLET ORAL EVERY 8 HOURS
Status: DISCONTINUED | OUTPATIENT
Start: 2021-07-31 | End: 2021-08-03 | Stop reason: HOSPADM

## 2021-07-31 RX ORDER — OXYCODONE HYDROCHLORIDE 5 MG/1
5 TABLET ORAL
Status: DISCONTINUED | OUTPATIENT
Start: 2021-07-31 | End: 2021-08-03 | Stop reason: HOSPADM

## 2021-07-31 RX ORDER — DIPHENHYDRAMINE HCL 25 MG
25 CAPSULE ORAL
Status: DISCONTINUED | OUTPATIENT
Start: 2021-07-31 | End: 2021-08-03 | Stop reason: HOSPADM

## 2021-07-31 RX ORDER — DOCUSATE SODIUM 100 MG/1
100 CAPSULE, LIQUID FILLED ORAL 2 TIMES DAILY
Status: DISCONTINUED | OUTPATIENT
Start: 2021-07-31 | End: 2021-08-03 | Stop reason: HOSPADM

## 2021-07-31 RX ADMIN — IBUPROFEN 800 MG: 800 TABLET, FILM COATED ORAL at 20:58

## 2021-07-31 RX ADMIN — SODIUM CHLORIDE 5 MILLION UNITS: 900 INJECTION INTRAVENOUS at 02:22

## 2021-07-31 RX ADMIN — Medication 10000 MILLI-UNITS: at 02:54

## 2021-07-31 RX ADMIN — Medication 87.3 MILLI-UNITS/MIN: at 03:05

## 2021-07-31 RX ADMIN — LIDOCAINE HYDROCHLORIDE 0.1 ML: 10 INJECTION, SOLUTION INFILTRATION; PERINEURAL at 03:00

## 2021-07-31 NOTE — PROGRESS NOTES
Delivery Note    Dr Loco Cespedes arrived to bedside at [phone]. Pt positioned for delivery and set up at [phone]. Spontaneous vaginal delivery of viable maoe infant @ 3798. Perineum with 1st degree and repaired. See delivery summary for details.

## 2021-07-31 NOTE — LACTATION NOTE
Started mom pumping for baby in [address] [place] [address]. Demonstrated use of Symphony pump and also hand expression. Assisted with colostrum retrieval from pump. Offered assistance in NCU once baby able to go to breast.  Got 2.5 ml, delivered to NCU. Provided labels for milk. Reviewed NCU packet. Reviewed need to pump 8 times in 24 hours. Proper storage for baby in NCU. Discussed NCU pump available for moms who are discharged before baby. Encouraged mom to pump at baby's bedside as well. Suggest skin to skin as often as able. Mom stopped pumping for her 13 month old 4 months ago.

## 2021-07-31 NOTE — PROGRESS NOTES
OB ED       Admit to [facility] 1. EFM and TOCO applied. States painful ctxs started around 2300. Abd palpated soft. Positive fetal movement noted. Hospitalist notified.

## 2021-07-31 NOTE — PROGRESS NOTES
SBAR IN Report: Mother    Verbal report received from Courtney Saunders RN on this patient, who is now being transferred from [facility] (unit) for routine progression of care. The patient is not wearing a green \"Anesthesia-Duramorph\" band. Report consisted of patient's Situation, Background, Assessment and Recommendations (SBAR).  ID bands were compared with the identification form, and verified with the patient and transferring nurse. Information from the Procedure Summary and the [place] Report was reviewed with the transferring nurse; opportunity for questions and clarification provided.

## 2021-07-31 NOTE — H&P
History & Physical    Name: Yesica Velazquez MRN: [id number]  SSN: [id number]    YOB: 1995  Age: 26 y.o. Sex: female      CC: contractions    Subjective:     Estimated Date of Delivery: 21  OB History    Para Term  AB Living   2 1 1     1   SAB TAB Ectopic Molar Multiple Live Births           0 1      # Outcome Date GA Lbr Tato/2nd Weight Sex Delivery Anes PTL Lv   2 Current            1 Term 20 40w6d 08:00 / 04:15 3.74 kg M Cristy Burkett       Ms. Kasper is seen with pregnancy at 35w0d for contractions since 11 pm. no vb or lof. . Prenatal course was complicated by gdm diet controlled. the patients states that the baby moves as usual   Please see prenatal records for details. Past Medical History:   Diagnosis Date    Gestational diabetes mellitus (GDM) 2021    Gestational hypertension      No past surgical history on file. Social History     Occupational History    Not on file   Tobacco Use    Smoking status: Never Smoker    Smokeless tobacco: Never Used   Substance and Sexual Activity    Alcohol use: Not Currently    Drug use: Not Currently    Sexual activity: Not on file     No family history on file. No Known Allergies  Prior to Admission medications    Medication Sig Start Date End Date Taking? Authorizing Provider   glucose blood VI test strips (FreeStyle Lite Strips) strip Test up to 5 times per day 21  Yes Chen Saldaña MD   lancets (FreeStyle Lancets) 28 gauge misc Test up to 5 times per day 21  Yes Chen Saldaña MD   calcium carbonate (TUMS) 200 mg calcium (500 mg) chew Take 2 Tablets by mouth daily as needed. Yes Provider, Historical   PNV66-Iron Fumarate-FA-DSS-DHA 26-1.2- mg cap Take 1 Tab by mouth daily.    Yes Provider, Historical        Review of Systems:  Constitutional:No headache, fever  Cardiac:   No chest pain      Resp: No cough or shortness of breath     GI:   No nausea/vomiting, diarrhea, abdominal pain    :   No dysuria  Neuro:     No vision changes, headache      Objective:     Vitals:  Vitals:    21 0101   BP: (!) 148/82   Pulse: 88   Resp: 18   Temp: 97.8 °F (36.6 °C)   Weight: 72.1 kg (159 lb)   Height: 5' 3\" (1.6 m)        Physical Exam:  Patient without distress. Heart: Regular rate and rhythm  Lung: clear to auscultation throughout lung fields, no wheezes, no rales, no rhonchi and normal respiratory effort  Back: costovertebral angle tenderness absent  Abdomen: soft, nontender, without guarding, without rebound  Fundus: soft and non tender  Cervical Exam: 7 cm dilated    100% effaced    0 station    Presenting Part: cephalic  Lower Extremities:  - Edema No  Membranes:  Intact  Fetal Heart Rate tracing: Category 1  Uterine contractions: regular, every 2-3 minutes    Prenatal Labs:   Lab Results   Component Value Date/Time    Rubella, External Immune 10/11/2019 12:00 AM    GrBStrep, External Negative 2020 12:00 AM    HBsAg, External Non-Reactive 10/11/2019 12:00 AM    HIV, External Non-Reactive 10/11/2019 12:00 AM    RPR, External Non-Reactive 10/11/2019 12:00 AM     gbs not done yet this pregnancy    Assessment/Plan:     Ms. Navas Pi is a  seen with pregnancy at 35w0d for active labor.         Plan:     Admit for labor management. Start pcn.      Patient discussed with Dr. Natalya Orta By:  Ginette Hurt MD     2021

## 2021-07-31 NOTE — PROGRESS NOTES
Pt requesting breast pump. This RN called [facility] for breast pump and supplies. Per Jacquelyn Donovan RN, Miguel Stage (lactation) will bring pump soon and see pt. Pt informed and voiced understanding.

## 2021-07-31 NOTE — L&D DELIVERY NOTE
Delivery Summary    Patient: Basia Hickey MRN: [id number]  SSN: [id number]    YOB: 1995  Age: 26 y.o. Sex: female     of LFMI over 1st degree lac and R labial lac. No complications. Information for the patient's :  Hanna Zimmerman [[id number]]       Labor Events:    Labor: Yes    Steroids: None   Cervical Ripening Date/Time:       Cervical Ripening Type: None   Antibiotics During Labor: Yes   Rupture Identifier:      Rupture Date/Time:       Rupture Type:     Amniotic Fluid Volume:      Amniotic Fluid Description:      Amniotic Fluid Odor:      Induction:         Induction Date/Time:        Indications for Induction:      Augmentation:     Augmentation Date/Time:      Indications for Augmentation:     Labor complications: Additional complications:        Delivery Events:  Indications For Episiotomy:     Episiotomy:     Perineal Laceration(s):     Repaired:     Periurethral Laceration Location:      Repaired:     Labial Laceration Location:     Repaired:     Sulcal Laceration Location:     Repaired:     Vaginal Laceration Location:     Repaired:     Cervical Laceration Location:     Repaired:     Repair Suture:     Number of Repair Packets:     Estimated Blood Loss (ml):  ml   Quantitative Blood Loss (ml)                Delivery Date: 2021    Delivery Time: 2:49 AM  Delivery Type: Vaginal, Spontaneous  Sex:  Male    Gestational Age: 35w0d   Delivery Clinician:  Flora Roth  Living Status:     Delivery Location: L&D 432          APGARS  One minute Five minutes Ten minutes   Skin color:            Heart rate:            Grimace:            Muscle tone:            Breathing: Totals:                Presentation: Vertex    Position:        Resuscitation Method:  Suctioning-bulb; Tactile Stimulation     Meconium Stained: None      Cord Information: 2 Vessels  Complications: None  Cord around:    Delayed cord clamping?  Yes  Cord clamped date/time:2021  2:50 AM  Disposition of Cord Blood: Lab    Blood Gases Sent?: Yes    Placenta:  Date/Time: 2021  2:54 AM  Removal: Spontaneous      Appearance: Normal      Measurements:  Birth Weight: 2.72 kg      Birth Length: 48.5 cm      Head Circumference: 31.5 cm      Chest Circumference: 31.5 cm     Abdominal Girth: Other Providers:   BEBA YOUNG;NICOLAS BECKER;BENTLEY SWANSON;KOSCHNITZKI, Lynett Klinefelter, Obstetrician;Primary Nurse;Charge Nurse;Neonatologist;Respiratory Therapist;Scrub Tech           Group B Strep:   Lab Results   Component Value Date/Time    GrBStrep, External Negative 2020 12:00 AM     Information for the patient's :  Violeta Zuñiga [[id number]]   No results found for: ABORH, PCTABR, PCTDIG, BILI, ABORHEXT, ABORH     No results for input(s): PCO2CB, PO2CB, HCO3I, SO2I, IBD, PTEMPI, SPECTI, PHICB, ISITE, IDEV, IALLEN in the last 72 hours.

## 2021-08-01 PROCEDURE — 65270000029 HC RM PRIVATE

## 2021-08-01 PROCEDURE — 74011250637 HC RX REV CODE- 250/637: Performed by: OBSTETRICS & GYNECOLOGY

## 2021-08-01 RX ADMIN — DOCUSATE SODIUM 100 MG: 100 CAPSULE ORAL at 23:06

## 2021-08-01 NOTE — PROGRESS NOTES
Post Partum day 1   Lucía Perez is a 26 y.o. female,   [address] well.  Moderate cramps   Ambulating well, voiding well   Bleeding decreasing   Pumping for baby in NICU    Vitals:    21 0932 21 1706 21 1730 21 2321   BP: 126/67 131/77 131/77 115/73   Pulse: 70 67 (!) 57 (!) 58   Resp: 20  16 16   Temp: 98.2 °F (36.8 °C) 98.1 °F (36.7 °C) 98.1 °F (36.7 °C) 98.2 °F (36.8 °C)   SpO2:   98% 99%   Weight:       Height:         Lungs- non-labored respirations  CVS- regular rate, normal peripheral perfusion  Abd- Soft, Non tender   Fundus- Firm, appropriately tender   Lochia- Normal   extrem-  Non tender    Lab Results   Component Value Date/Time    WBC 11.1 2021 02:00 AM    HGB 13.1 2021 02:00 AM    HCT 40.7 2021 02:00 AM    PLATELET 357 (L)  02:00 AM    MCV 90.4 2021 02:00 AM       Imp- Stable PP 1 s/p        Plan- Goal for breastfeeding support and bonding right now as baby in NICU, , with initial respiratory distress and hypoglycemia      Mei Hernandez MD

## 2021-08-01 NOTE — LACTATION NOTE
This note was copied from a baby's chart. In to follow up with mom. She stated that she has continued to pump every 3 hours and she is expressing 10 ml. She did state that her nipples are sore. Instructed her to use the 27 mm flange to see if they are a better fit. She stated that other wise she has no concerns. Lactation consultant will follow up tomorrow.

## 2021-08-02 LAB
ALBUMIN SERPL-MCNC: 2.8 G/DL (ref 3.5–5)
ALBUMIN/GLOB SERPL: 0.7 {RATIO} (ref 1.2–3.5)
ALP SERPL-CCNC: 109 U/L (ref 50–136)
ALT SERPL-CCNC: 19 U/L (ref 12–65)
ANION GAP SERPL CALC-SCNC: 3 MMOL/L (ref 7–16)
AST SERPL-CCNC: 19 U/L (ref 15–37)
BILIRUB SERPL-MCNC: 0.3 MG/DL (ref 0.2–1.1)
BUN SERPL-MCNC: 21 MG/DL (ref 6–23)
CALCIUM SERPL-MCNC: 9.2 MG/DL (ref 8.3–10.4)
CHLORIDE SERPL-SCNC: 108 MMOL/L (ref 98–107)
CO2 SERPL-SCNC: 29 MMOL/L (ref 21–32)
CREAT SERPL-MCNC: 1.21 MG/DL (ref 0.6–1)
ERYTHROCYTE [DISTWIDTH] IN BLOOD BY AUTOMATED COUNT: 12.9 % (ref 11.9–14.6)
GLOBULIN SER CALC-MCNC: 4.1 G/DL (ref 2.3–3.5)
GLUCOSE SERPL-MCNC: 89 MG/DL (ref 65–100)
HCT VFR BLD AUTO: 40.8 % (ref 35.8–46.3)
HGB BLD-MCNC: 13.3 G/DL (ref 11.7–15.4)
MCH RBC QN AUTO: 29.8 PG (ref 26.1–32.9)
MCHC RBC AUTO-ENTMCNC: 32.6 G/DL (ref 31.4–35)
MCV RBC AUTO: 91.5 FL (ref 79.6–97.8)
NRBC # BLD: 0 K/UL (ref 0–0.2)
PLATELET # BLD AUTO: 197 K/UL (ref 150–450)
PMV BLD AUTO: 10.3 FL (ref 9.4–12.3)
POTASSIUM SERPL-SCNC: 3.9 MMOL/L (ref 3.5–5.1)
PROT SERPL-MCNC: 6.9 G/DL (ref 6.3–8.2)
RBC # BLD AUTO: 4.46 M/UL (ref 4.05–5.2)
SODIUM SERPL-SCNC: 140 MMOL/L (ref 136–145)
WBC # BLD AUTO: 10.4 K/UL (ref 4.3–11.1)

## 2021-08-02 PROCEDURE — 36415 COLL VENOUS BLD VENIPUNCTURE: CPT

## 2021-08-02 PROCEDURE — 80053 COMPREHEN METABOLIC PANEL: CPT

## 2021-08-02 PROCEDURE — 2709999900 HC NON-CHARGEABLE SUPPLY

## 2021-08-02 PROCEDURE — 74011250637 HC RX REV CODE- 250/637: Performed by: OBSTETRICS & GYNECOLOGY

## 2021-08-02 PROCEDURE — 65270000029 HC RM PRIVATE

## 2021-08-02 PROCEDURE — 85027 COMPLETE CBC AUTOMATED: CPT

## 2021-08-02 RX ORDER — NIFEDIPINE 30 MG/1
30 TABLET, EXTENDED RELEASE ORAL DAILY
Status: DISCONTINUED | OUTPATIENT
Start: 2021-08-02 | End: 2021-08-03 | Stop reason: HOSPADM

## 2021-08-02 RX ADMIN — NIFEDIPINE 30 MG: 30 TABLET, FILM COATED, EXTENDED RELEASE ORAL at 17:03

## 2021-08-02 NOTE — PROGRESS NOTES
Post Partum day 1   Mike Chávez is a 26 y.o. female,   [address] well. Moderate cramps   Ambulating well, voiding well   Bleeding decreasing   Pumping for baby in NICU    Vitals:    21 1905 21 2306 21 0430 21 0725   BP: 137/85 (!) 153/90 (!) 143/85 (!) 140/86   Pulse: 77 60 (!) 58 (!) 55   Resp: 16 16 18 16   Temp:   97.6 °F (36.4 °C) 97.9 °F (36.6 °C)   SpO2: 96% 98% 99% 98%   Weight:       Height:         Lungs- non-labored respirations  CVS- regular rate, normal peripheral perfusion  Abd- Soft, Non tender   Fundus- Firm, appropriately tender   Lochia- Normal   extrem-  Non tender    Lab Results   Component Value Date/Time    WBC 11.1 2021 02:00 AM    HGB 13.1 2021 02:00 AM    HCT 40.7 2021 02:00 AM    PLATELET 638 (L)  02:00 AM    MCV 90.4 2021 02:00 AM       Imp-PPD 2 s/p        Plan- Goal for breastfeeding support and bonding right now as baby in NICU, , with initial respiratory distress and hypoglycemia  BPs are elevated. CBC/CMP now. Monitor Bps until this evening. If normal and labs normal, can d/c this evening. If Bps still elevated, plan to keep overnight.     Mario Ely MD

## 2021-08-02 NOTE — LACTATION NOTE
Lactation visit. Mom pumping for SCN baby. Baby stable in [facility]. Room air now, has UVC line. Mom pumping, milk coming in well now. Mom pumping ~40ml now total. Pumps every 3 hours. Doing well with pumping. Has 2 medela pumps at home, declined need for rental pump now. Can do rental as needed. Discussed pumping in SCN. LC to assist with latching once UVC line out and baby can go to breast. No issues, likes use of 27mm flanges. Discussed coconut oil with pumping for lubrication. Questions answered. Doing well.  LC to continue to assist.

## 2021-08-02 NOTE — PROGRESS NOTES
Chart reviewed - baby admitted to NICU (35 week gestation). SW met with patient/ while social distancing w/appropriate PPE. Discussed how family is coping with baby Candido's early arrival/NICU admission. Emotional support offered. Family lives in [place] and has reliable transportation to/from hospital.      Patient denies any history of postpartum depression/anxiety. Patient given informational packet on  mood & anxiety disorders (resources/education). Family denies any additional needs from  at this time. Family has 's contact information should any needs/questions arise.     RENAY Desouza-LAMONT  [address] [place] [address]   [phone]

## 2021-08-03 VITALS
HEART RATE: 71 BPM | TEMPERATURE: 97.9 F | OXYGEN SATURATION: 99 % | HEIGHT: 63 IN | RESPIRATION RATE: 16 BRPM | WEIGHT: 159 LBS | SYSTOLIC BLOOD PRESSURE: 135 MMHG | BODY MASS INDEX: 28.17 KG/M2 | DIASTOLIC BLOOD PRESSURE: 84 MMHG

## 2021-08-03 LAB — CREAT SERPL-MCNC: 0.96 MG/DL (ref 0.6–1)

## 2021-08-03 PROCEDURE — 36415 COLL VENOUS BLD VENIPUNCTURE: CPT

## 2021-08-03 PROCEDURE — 74011250637 HC RX REV CODE- 250/637: Performed by: OBSTETRICS & GYNECOLOGY

## 2021-08-03 PROCEDURE — 82565 ASSAY OF CREATININE: CPT

## 2021-08-03 RX ORDER — NIFEDIPINE 30 MG/1
30 TABLET, EXTENDED RELEASE ORAL DAILY
Qty: 30 TABLET | Refills: 1 | Status: SHIPPED | OUTPATIENT
Start: 2021-08-03

## 2021-08-03 RX ORDER — IBUPROFEN 800 MG/1
800 TABLET ORAL EVERY 8 HOURS
Qty: 90 TABLET | Refills: 0 | Status: SHIPPED | OUTPATIENT
Start: 2021-08-03

## 2021-08-03 RX ADMIN — NIFEDIPINE 30 MG: 30 TABLET, FILM COATED, EXTENDED RELEASE ORAL at 08:22

## 2021-08-03 NOTE — LACTATION NOTE
Mom going home pumping for baby in NCU. Encouraged continued pumping. Reviewed supply and demand. Will assist w/ putting baby to breast as able/desired. Planning for feeding once central line is out. Possibly 8-4-21. Mom getting 60 ml with pumping. Will pump at home with her pump and here with [facility].

## 2021-08-03 NOTE — DISCHARGE INSTRUCTIONS
Patient Education   Discharge instruction to follow: Activity: Pelvis rest for 6 weeks     No heavy lifting over 15 lbs for 2 weeks     No driving for 2 weeks     No push/pull motion such as sweeping or vacuuming for 2 weeks     No tub baths for 6 weeks    Continue using the hygenique wand after each void or bowel movement. If using sitz bath continue until comfortable stopping. If using raquel-bottle continue to use until comfortable stopping. Change sanitary pad after each urination or bowel movement. Call MD for the following:      Fever over 101 F; pain not relieved by medication; foul smelling vaginal discharge or an increase in vaginal bleeding. Take medication as prescribed. Follow up with MD as order. Vaginal Childbirth: Care Instructions  Overview     Vaginal birth means delivering a baby through the birth canal (vagina). During labor, the uterus tightens (contracts) regularly to thin and open the cervix and to push the baby out through the birth canal.  Your body will slowly heal in the next few weeks. It's easy to get too tired and overwhelmed during the first weeks after your baby is born. Changes in your hormones can shift your mood without warning. You may find it hard to meet the extra demands on your energy and time. Take it easy on yourself. Follow-up care is a key part of your treatment and safety. Be sure to make and go to all appointments, and call your doctor if you are having problems. It's also a good idea to know your test results and keep a list of the medicines you take. How can you care for yourself at home? Vaginal bleeding and cramps  · After delivery, you will have a bloody discharge from your vagina. This will turn pink within a week and then white or yellow after about 10 days. It may last for 2 to 4 weeks or longer, until the uterus has healed. Use sanitary pads until you stop bleeding. Using pads makes it easier to monitor your bleeding.   · Don't worry if you pass some blood clots, as long as they are smaller than a golf ball. If you have a tear or stitches in your vaginal area, change the pad at least every 4 hours. This will help prevent soreness and infection. · You may have cramps for the first few days after childbirth. These are normal and occur as the uterus shrinks to normal size. Take an over-the-counter pain medicine, such as acetaminophen (Tylenol), ibuprofen (Advil, Motrin), or naproxen (Aleve), for cramps. Read and follow all instructions on the label. Do not take aspirin, because it can cause more bleeding. · Do not take two or more pain medicines at the same time unless the doctor told you to. Many pain medicines have acetaminophen, which is Tylenol. Too much acetaminophen (Tylenol) can be harmful. Stitches  · If you have stitches, they will dissolve on their own and don't need to be removed. Follow your doctor's instructions for cleaning the stitched area. · Put ice or a cold pack on your painful area for 10 to 20 minutes at a time, several times a day, for the first few days. Put a thin cloth between the ice and your skin. · Sit in a few inches of warm water (sitz bath) 3 times a day and after bowel movements. The warm water helps with pain and itching. If you don't have a tub, a warm shower might help. Breast fullness  · Your breasts may overfill (engorge) in the first few days after delivery. To help milk flow and to relieve pain, warm your breasts in the shower or by using warm, moist towels before nursing. · If you aren't nursing, don't put warmth on your breasts or touch your breasts. Wear a bra that fits well and use ice until the fullness goes away. This usually takes 2 to 3 days. · Put ice or a cold pack on your breast after nursing to reduce swelling and pain. Put a thin cloth between the ice and your skin. Activity  · Eat a balanced diet. Don't try to lose weight by cutting calories.  Keep taking your prenatal vitamins, or take a multivitamin. · Get as much rest as you can. Try to take naps when your baby sleeps during the day. · Get some exercise every day. But don't do any heavy exercise until your doctor says it is okay. · Wait until you are healed (about 4 to 6 weeks) before you have sexual intercourse. Your doctor will tell you when it is okay to have sex. · If you don't want to get pregnant, talk to your doctor about birth control. You can get pregnant even before your period returns. Also, you can get pregnant while you are breastfeeding. Mental health  · It's normal to have some sadness, anxiety, sleeplessness, and mood swings after you go home. If you feel upset or hopeless for more than a few days or are having trouble doing the things you need to do, talk to your doctor. Constipation and hemorrhoids  · Drink plenty of fluids. If you have kidney, heart, or liver disease and have to limit fluids, talk with your doctor before you increase the amount of fluids you drink. · Eat plenty of fiber each day. Have a bran muffin or bran cereal for breakfast. Try eating a piece of fruit for a mid-afternoon snack. · For painful, itchy hemorrhoids, put ice or a cold pack on the area several times a day for 10 minutes at a time. Follow this by putting a warm compress on the area for another 10 to 20 minutes or by sitting in a shallow, warm bath. When should you call for help? Call  911 anytime you think you may need emergency care. For example, call if:    · You have thoughts of harming yourself, your baby, or another person.     · You passed out (lost consciousness).     · You have chest pain, are short of breath, or cough up blood.     · You have a seizure.    Call your doctor now or seek immediate medical care if:    · You have severe vaginal bleeding.     · You are dizzy or lightheaded, or you feel like you may faint.     · You have a fever.     · You have new or more pain in your belly or pelvis.     · You have symptoms of a blood clot in your leg (called a deep vein thrombosis), such as:  ? Pain in the calf, back of the knee, thigh, or groin. ? Redness and swelling in your leg or groin.     · You have signs of preeclampsia, such as:  ? Sudden swelling of your face, hands, or feet. ? New vision problems (such as dimness, blurring, or seeing spots). ? A severe headache. Watch closely for changes in your health, and be sure to contact your doctor if:    · Your vaginal bleeding seems to be getting heavier.     · You have new or worse vaginal discharge.     · You feel sad, anxious, or hopeless for more than a few days.     · You do not get better as expected. Where can you learn more? Go to http://www.gray.com/  Enter Q237 in the search box to learn more about \"Vaginal Childbirth: Care Instructions. \"  Current as of: October 8, 2020               Content Version: 12.8  © 4160-4487 [employer]. Care instructions adapted under license by "[employer] (which disclaims liability or warranty for this information). If you have questions about a medical condition or this instruction, always ask your healthcare professional. [place] [address] any warranty or liability for your use of this information.

## 2021-08-03 NOTE — DISCHARGE SUMMARY
Obstetrical Discharge Summary     Name: Halie Unger MRN: [id number]  SSN: [id number]    YOB: 1995  Age: 26 y.o. Sex: female      Allergies: Patient has no known allergies. Admit Date: 2021    Discharge Date: 8/3/2021     Admitting Physician: Duncan Arteaga MD     Attending Physician:  Kyle Stover MD     * Admission Diagnoses:  labor in third trimester [O60.03]    * Discharge Diagnoses:   Information for the patient's :  Blaise Gray [[id number]]   Delivery of a 2.72 kg male infant via Vaginal, Spontaneous on 2021 at 2:49 AM  by Imtiaz Xiong. Apgars were 7  and 7 . Additional Diagnoses:   Hospital Problems as of 8/3/2021 Date Reviewed: 2021        Codes Class Noted - Resolved POA     labor in third trimester ICD-10-CM: O60.03  ICD-9-CM: 644.20  2021 - Present Unknown             Lab Results   Component Value Date/Time    ABO/Rh(D) O POSITIVE 2021 02:00 AM    Rubella, External Immune 10/11/2019 12:00 AM    GrBStrep, External Negative 2020 12:00 AM    There is no immunization history for the selected administration types on file for this patient. * Procedures:   * No surgery found *           * Discharge Condition: [facility] Course: Postpartum course was complicated by hypertension, which added 1 days to the patient's length of stay. Procardia XL 30mg was added to the patient's regimen for BP control. Labs were WNL. * Disposition: Home    Discharge Medications:   Current Discharge Medication List      START taking these medications    Details   ibuprofen (MOTRIN) 800 mg tablet Take 1 Tablet by mouth every eight (8) hours. Qty: 90 Tablet, Refills: 0  Start date: 8/3/2021      NIFEdipine ER (PROCARDIA XL) 30 mg ER tablet Take 1 Tablet by mouth daily.   Qty: 30 Tablet, Refills: 1  Start date: 8/3/2021         CONTINUE these medications which have NOT CHANGED    Details   calcium carbonate (TUMS) 200 mg calcium (500 mg) chew Take 2 Tablets by mouth daily as needed. PNV66-Iron Fumarate-FA-DSS-DHA 26-1.2- mg cap Take 1 Tab by mouth daily. STOP taking these medications       glucose blood VI test strips (FreeStyle Lite Strips) strip Comments:   Reason for Stopping:         lancets (FreeStyle Lancets) 28 gauge misc Comments:   Reason for Stopping:               * Follow-up Care/Patient Instructions: Activity: No sex for 6 weeks  Diet: Regular Diet  Wound Care:  Ice to area for comfort    Follow-up Information     Follow up With Specialties Details Why Contact Info    Other, Phys, MD    Patient can only remember the practice name and not the physician

## 2021-08-03 NOTE — ADT AUTH CERT NOTES
NOTIFICATION OF INPATIENT ADMISSION     THIS IS FOR A LABOR AND DELIVERY URGENT ADMISSION     MOM WAS ADMITTED ON - SHE IS STILL IN HOUSE  (BABY IN NICU- THAT REQUEST WILL COME AS A SEPARATE REQUEST/NOTIFICATION)     Patient Name :Edward Del Valle   : 1995 (25 yrs)    Insurance Plan Payor: John Sevilla / Plan: Carlita Hernandez 954 / Product Type: PPO /      Primary Coverage Subscriber ID : [id number]    ADMITTED 2021    UR CONTACT   TIM NAREN   [phone]    PLEASE FAX BACK PENDING AUTH NUMBER, STATUS UPDATES AND CLINICAL REQUESTS [id number]    [facility] YOU SO MUCH!!         FACILITY   [facility]     FACILITY NPI : [id number]     [facility] 4 MOTHER INFANT  [address] [facility] [address] [facility] [address]  [phone]         PATIENT      Patient Name :Edward Del Valle   : 1995 (25 yrs)  MRN : [id number]     Patient Mailing Address [address] [place] [address] [] , [address] [place] [address] INFORMATION      Insurance Plan Payor: BLUE CROSS / Plan: [place] BLUE [place]OSS OF [facility] / Product Type: PPO /      Primary Coverage Subscriber ID : [id number]     Secondary Coverage:  N/A     Secondary Subscriber ID :        Current Patient Class : INPATIENT  Admit Date : 2021     REQUESTED LEVEL OF CARE: INPATIENT [101]                                                           Diagnosis :  labor in third trimester                          ICD10 Code :  labor in third trimester [O60.03]     Admitting and Attending Info:  Admitting Provider : Radha Shipman MD   NPI: [id number]  Admitting Provider Phone. [phone]  Admitting Provider Address: [address]     Attending Provider Annie Arias MD   [id number]  Attending Provider Address: SAME AS FACILITY            CLINICAL TO FOLLOW:       H&P     Name: Rick Perea MRN: [id number]  SSN: [id number]    YOB: 1995  Age: 26 y.o. Sex: female       CC: contractions     Subjective:      Estimated Date of Delivery: 21                    OB History    Para Term  AB Living   2 1 1     1   SAB TAB Ectopic Molar Multiple Live Births            0 1       # Outcome Date GA Lbr Tato/2nd Weight Sex Delivery Anes PTL Lv   2 Current                     1 Term 20 40w6d 08:00 / 04:15 3.74 kg M Vag-Vacuum EPI N DARYL         Ms. Kasper is seen with pregnancy at 35w0d for contractions since 11 pm. no vb or lof. . Prenatal course was complicated by gdm diet controlled. the patients states that the baby moves as usual   Please see prenatal records for details.          Past Medical History:   Diagnosis Date    Gestational diabetes mellitus (GDM) 2021    Gestational hypertension        No past surgical history on file. Social History           Occupational History    Not on file   Tobacco Use    Smoking status: Never Smoker    Smokeless tobacco: Never Used   Substance and Sexual Activity    Alcohol use: Not Currently    Drug use: Not Currently    Sexual activity: Not on file      No family history on file.     No Known Allergies          Prior to Admission medications    Medication Sig Start Date End Date Taking?  Authorizing Provider   glucose blood VI test strips (FreeStyle Lite Strips) strip Test up to 5 times per day 21   Yes Arlet Hernandez MD   lancets (FreeStyle Lancets) 28 gauge misc Test up to 5 times per day 21   Yes Arlet Hernandez MD   calcium carbonate (TUMS) 200 mg calcium (500 mg) chew Take 2 Tablets by mouth daily as needed.     Yes Provider, Historical   PNV66-Iron Fumarate-FA-DSS-DHA 26-1.2- mg cap Take 1 Tab by mouth daily.     Yes Provider, Historical         Review of Systems:  Constitutional:No headache, fever  Cardiac:   No chest pain      Resp: No cough or shortness of breath     GI:   No nausea/vomiting, diarrhea, abdominal pain    :   No dysuria  Neuro:     No vision changes, headache        Objective:      Vitals:      Vitals:     21 0101   BP: (!) 148/82   Pulse: 88   Resp: 18   Temp: 97.8 °F (36.6 °C)   Weight: 72.1 kg (159 lb)   Height: 5' 3\" (1.6 m)         Physical Exam:  Patient without distress. Heart: Regular rate and rhythm  Lung: clear to auscultation throughout lung fields, no wheezes, no rales, no rhonchi and normal respiratory effort  Back: costovertebral angle tenderness absent  Abdomen: soft, nontender, without guarding, without rebound  Fundus: soft and non tender  Cervical Exam: 7 cm dilated    100% effaced    0 station    Presenting Part: cephalic  Lower Extremities:  - Edema No  Membranes:  Intact  Fetal Heart Rate tracing: Category 1  Uterine contractions: regular, every 2-3 minutes     Prenatal Labs:         Lab Results   Component Value Date/Time     Rubella, External Immune 10/11/2019 12:00 AM     GrBStrep, External Negative 2020 12:00 AM     HBsAg, External Non-Reactive 10/11/2019 12:00 AM     HIV, External Non-Reactive 10/11/2019 12:00 AM     RPR, External Non-Reactive 10/11/2019 12:00 AM      gbs not done yet this pregnancy     Assessment/Plan:      Ms. Zheng Garcia is a  seen with pregnancy at 35w0d for active labor.            Plan:      Admit for labor management.  Start pcn.      Patient discussed with Dr. Glory Green By:  Duncan Arteaga MD      2021

## 2021-08-04 NOTE — ADT AUTH CERT NOTES
NOTIFICATION OF INPATIENT ADMISSION     THIS IS FOR A LABOR AND DELIVERY URGENT ADMISSION     MOM WAS ADMITTED ON - SHE IS STILL IN HOUSE  (BABY IN NICU- THAT REQUEST WILL COME AS A SEPARATE REQUEST/NOTIFICATION)     Patient Name :Rogers Diaz   : 1995 (25 yrs)    Insurance Plan Payor: April Puckett / Plan: [facility] OF [address] / Product Type: PPO /      Primary Coverage Subscriber ID : [id number]    ADMITTED 2021    UR CONTACT   TIM SNEED   [phone]    PLEASE FAX BACK PENDING AUTH NUMBER, STATUS UPDATES AND CLINICAL REQUESTS -[id number]    THANK YOU SO MUCH!! Facility Name: [address] [place] [address]                               Patient Demographics    Patient Name   Татьяна Burgess Legal Sex   Female    1995 Address   [place] [id number] Phone   [phone] [facility]   [phone] [facility]   Hospital Account    Name Acct ID Class Status Primary Coverage   Татьяна Burgess [id number] INPATIENT Discharged/Not Billed ODILON RÍOS - [facility] 954          Guarantor Account (for Hospital Account [de-identified])    Name Relation to Pt Service Area Active? Acct Type   Татьяна Burgess Self [facility] Yes Personal/Family   Address Phone     [place] [address] [phone](H)            Coverage Information (for Hospital Account [de-identified])    F/O Payor/Plan Subscriber  Subscriber Sex Precert #   [facility] 89 F    Subscriber Subscriber #   Татьяна Burgess [id number]   [facility] # Group Name   [id number]    Address Phone   [address]  [facility], [address] [place] [address]    Policy Number Status Effective Date Benefits Phone   [id number] -  -   Auth/Cert   REF# [id number]          Admission Information    Arrival Date/Time: 2021 Admit Date/Time: 2021 IP Adm.  Date/Time: 2021 0139   Admission Type: Urgent Point of Origin: Non-health Care Facility/self Admit Category:    Means of Arrival:  Primary Service: Obstetrics Secondary Service: N/A   Transfer Source:  Service Area: [facility] Unit: SFE 4 MOTHER INFANT   Admit Provider: Ras Hughes MD Attending Provider: Gini Nichols MD Referring Provider:    Admission Information    Attending Provider Admission Dx Admitted on     labor in third trimester 21   Service Isolation Code Status   OBSTETRICS  Prior   Allergies Advance Care Planning    No Known Allergies Jump to the Activity     Admission Information    Unit/Bed: SFE 4 MOTHER INFANT Service: OBSTETRICS   Admitting provider: Ras Hughes MD Phone: [phone]   Attending provider:  Phone:    PCP: Trevon Chambers MD Phone:    Admission dx: Ctx Patient class: I   Admission type: UR     Patient Demographics    Patient Name   Rojas [place]   [id number] Legal Sex   Female    1995 Address   [address] [place] [address] \[facility] [place] [facility]\"" 227 Phone   [phone] (Home)   [phone] (Mobile)   H&P Notes     H&P by Ras Hughes MD at 21 documented on Admission (Discharged) from 2021 in [address]  Author: Ras Hughes MD Author Type: Physician Filed: 21 0143   Note Status: Signed Cosign: Cosign Not Required Date of Service: 21   : Ras Hughes MD (Physician)              History & Physical     Name: Robyn Neal MRN: [id number]  SSN: [id number]    YOB: 1995  Age: 26 y.o. Sex: female       CC: contractions     Subjective:      Estimated Date of Delivery: 21                    OB History    Para Term  AB Living   2 1 1     1   SAB TAB Ectopic Molar Multiple Live Births            0 1       # Outcome Date GA Lbr Tato/2nd Weight Sex Delivery Anes PTL Lv   2 Current                     1 Term 20 40w6d 08:00 / 04:15 3.74 kg M Vag-Vacuum EPI N DARYL         Ms. Kasper is seen with pregnancy at 35w0d for contractions since 11 pm. no vb or lof.  . Prenatal course was complicated by gdm diet controlled. the patients states that the baby moves as usual   Please see prenatal records for details.          Past Medical History:   Diagnosis Date    Gestational diabetes mellitus (GDM) 06/08/2021    Gestational hypertension        No past surgical history on file. Social History           Occupational History    Not on file   Tobacco Use    Smoking status: Never Smoker    Smokeless tobacco: Never Used   Substance and Sexual Activity    Alcohol use: Not Currently    Drug use: Not Currently    Sexual activity: Not on file      No family history on file.     No Known Allergies          Prior to Admission medications    Medication Sig Start Date End Date Taking? Authorizing Provider   glucose blood VI test strips (FreeStyle Lite Strips) strip Test up to 5 times per day 6/8/21   Yes Kyle Delaney MD   lancets (FreeStyle Lancets) 28 gauge misc Test up to 5 times per day 6/8/21   Yes Kyle Delaney MD   calcium carbonate (TUMS) 200 mg calcium (500 mg) chew Take 2 Tablets by mouth daily as needed.     Yes Provider, Historical   PNV66-Iron Fumarate-FA-DSS-DHA 26-1.2- mg cap Take 1 Tab by mouth daily.     Yes Provider, Historical         Review of Systems:  Constitutional:No headache, fever  Cardiac:   No chest pain      Resp: No cough or shortness of breath     GI:   No nausea/vomiting, diarrhea, abdominal pain    :   No dysuria  Neuro:     No vision changes, headache        Objective:      Vitals:      Vitals:     07/31/21 0101   BP: (!) 148/82   Pulse: 88   Resp: 18   Temp: 97.8 °F (36.6 °C)   Weight: 72.1 kg (159 lb)   Height: 5' 3\" (1.6 m)         Physical Exam:  Patient without distress.   Heart: Regular rate and rhythm  Lung: clear to auscultation throughout lung fields, no wheezes, no rales, no rhonchi and normal respiratory effort  Back: costovertebral angle tenderness absent  Abdomen: soft, nontender, without guarding, without rebound  Fundus: soft and non tender  Cervical Exam: 7 cm dilated    100% effaced    0 station    Presenting Part: cephalic  Lower Extremities:  - Edema No  Membranes:  Intact  Fetal Heart Rate tracing: Category 1  Uterine contractions: regular, every 2-3 minutes     Prenatal Labs:         Lab Results   Component Value Date/Time     Rubella, External Immune 10/11/2019 12:00 AM     GrBStrep, External Negative 2020 12:00 AM     HBsAg, External Non-Reactive 10/11/2019 12:00 AM     HIV, External Non-Reactive 10/11/2019 12:00 AM     RPR, External Non-Reactive 10/11/2019 12:00 AM      gbs not done yet this pregnancy     Assessment/Plan:      Ms. Zheng Garcia is a  seen with pregnancy at 35w0d for active labor.            Plan:      Admit for labor management. Start pcn.      Patient discussed with Dr. Glory Green By:  Duncan Arteaga MD      2021           Patient Demographics    Patient Name   Santy العراقي   [id number] Legal Sex   Female    1995 Address   [place] [address] Phone   [phone] (Home)   [phone] (Mobile)   CSN:   [id number]   Admit Date: Admit Time Room Bed   2021 12:44  [29] 01 [2149]   Attending Providers    Provider Pager From To   Kyle Stover MD  21   Emergency Contact(s)    Name Relation Home Work [address] [facility] [phone]     Utilization Reviews       requested Clinical  2021 by Celeste Mendiola RN       Review Entered Review Status   2021 14:50 In Primary      Criteria Review   Clinical 2021:  Postpartum , s/p spontaneous delivery on , course was complicated by hypertension, which added 1 days to the patient's length of stay.  Procardia XL 30mg was added to the patient's regimen for BP control. Labs were WNL. .   Lactation visit. Mom pumping for SCN baby. Baby stable in SCN. Room air now, has UVC line. Mom pumping, milk coming in well now. Mom pumping ~40ml now total. Pumps every 3 hours. Doing well with pumping. Has 2 medela pumps at home, declined need for rental pump now. Can do rental as needed. Discussed pumping in SCN. LC to assist with latching once UVC line out and baby can go to breast. No issues, likes use of 27mm flanges. Discussed coconut oil with pumping for lubrication. Questions answered. Doing well.  LC to continue to assist.             VITALS:T- 97.6  B/P-149/91  HR-61  RR-18  SATS-100  RA     LABS:  WBC-10.4  H/h-13.3/40.8  Chlor-108  Creat-1.21  Albumin- 2.8     ORDERS:  Breast feed  Procardial xl 30 mg po qd       FACILITY   [facility]     FACILITY NPI : [id number]     [facility] SFE 4 MOTHER INFANT  [address] [facility] [address] [phone]         PATIENT      Patient Name :Chago Guerra   : 1995 (25 yrs)  MRN : [id number]     Patient Mailing Address [address] [place] [address] [] , [id number]                                                        .      [facility] INFORMATION      Insurance Plan Payor: Kris Tai / Plan: [facility] / Product Type: PPO /      Primary Coverage Subscriber ID : [id number]     Secondary Coverage:  N/A     Secondary Subscriber ID :        Current Patient Class : INPATIENT  Admit Date : 2021     REQUESTED LEVEL OF CARE: INPATIENT [101]                                                           Diagnosis :  labor in third trimester                          ICD10 Code :  labor in third trimester [O60.03]     Admitting and Attending Info:  Admitting Provider : Alessio Heck MD   NPI: [id number]  Admitting Provider Phone. [phone]  Admitting Provider Address: [address]     Attending Provider Peggy Santizo MD   [id number]  Attending Provider Address: SAME AS FACILITY            CLINICAL TO FOLLOW:       H&P     Name: Chago Guerra MRN: [id number]  SSN: [id number]    YOB: 1995  Age: 26 y.o. Sex: female       CC: contractions     Subjective:      Estimated Date of Delivery: 21                    OB History    Para Term  AB Living   2 1 1     1   SAB TAB Ectopic Molar Multiple Live Births            0 1       # Outcome Date GA Lbr Tato/2nd Weight Sex Delivery Anes PTL Lv   2 Current                     1 Term 20 40w6d 08:00 / 04:15 3.74 kg M Vag-Vacuum EPI N DARYL         Ms. Kasper is seen with pregnancy at 35w0d for contractions since 11 pm. no vb or lof. . Prenatal course was complicated by gdm diet controlled. the patients states that the baby moves as usual   Please see prenatal records for details.          Past Medical History:   Diagnosis Date    Gestational diabetes mellitus (GDM) 2021    Gestational hypertension        No past surgical history on file. Social History           Occupational History    Not on file   Tobacco Use    Smoking status: Never Smoker    Smokeless tobacco: Never Used   Substance and Sexual Activity    Alcohol use: Not Currently    Drug use: Not Currently    Sexual activity: Not on file      No family history on file.     No Known Allergies          Prior to Admission medications    Medication Sig Start Date End Date Taking?  Authorizing Provider   glucose blood VI test strips (FreeStyle Lite Strips) strip Test up to 5 times per day 21   Yes Elizabeth Lyons MD   lancets (FreeStyle Lancets) 28 gauge misc Test up to 5 times per day 21   Yes Elizabeth Lyons MD   calcium carbonate (TUMS) 200 mg calcium (500 mg) chew Take 2 Tablets by mouth daily as needed.     Yes Provider, Historical   PNV66-Iron Fumarate-FA-DSS-DHA 26-1.2- mg cap Take 1 Tab by mouth daily.     Yes Provider, Historical         Review of Systems:  Constitutional:No headache, fever  Cardiac:   No chest pain      Resp: No cough or shortness of breath     GI:   No nausea/vomiting, diarrhea, abdominal pain :   No dysuria  Neuro:     No vision changes, headache        Objective:      Vitals:      Vitals:     21 0101   BP: (!) 148/82   Pulse: 88   Resp: 18   Temp: 97.8 °F (36.6 °C)   Weight: 72.1 kg (159 lb)   Height: 5' 3\" (1.6 m)         Physical Exam:  Patient without distress. Heart: Regular rate and rhythm  Lung: clear to auscultation throughout lung fields, no wheezes, no rales, no rhonchi and normal respiratory effort  Back: costovertebral angle tenderness absent  Abdomen: soft, nontender, without guarding, without rebound  Fundus: soft and non tender  Cervical Exam: 7 cm dilated    100% effaced    0 station    Presenting Part: cephalic  Lower Extremities:  - Edema No  Membranes:  Intact  Fetal Heart Rate tracing: Category 1  Uterine contractions: regular, every 2-3 minutes     Prenatal Labs:         Lab Results   Component Value Date/Time     Rubella, External Immune 10/11/2019 12:00 AM     GrBStrep, External Negative 2020 12:00 AM     HBsAg, External Non-Reactive 10/11/2019 12:00 AM     HIV, External Non-Reactive 10/11/2019 12:00 AM     RPR, External Non-Reactive 10/11/2019 12:00 AM      gbs not done yet this pregnancy     Assessment/Plan:      Ms. Abel Gordon is a  seen with pregnancy at 35w0d for active labor.            Plan:      Admit for labor management.  Start pcn.      Patient discussed with Dr. Scott Fees By:  Felipe Ridley MD      2021             Ulysses Kempf  MRN: [id number]   Link to Baby  Comment     Last edited by  on  at    Baby's name MRN Account  Age Sex Admission Type   Jennifer Wilson [id number] 21 4 days M Confirmed Admission - L&D [place]   OB History       2    Para   2    Term   1       1    AB        Living   2      SAB        TAB        Ectopic        Molar        Multiple   0    Live Births   2         # Outcome Date GA Labor/2nd Weight Sex Delivery Anes PTL Lv A1 A5   1 Term 20 40w6d 8h 00m / 4h 15m 3.74 kg M Vag-Vacuum Epidural N Living 8 9   Name: [facility]   Location: Other   Delivering Clinician: Fer Rehman MD      2  21 35w0d 0h 11m / 0h 38m 2.72 kg M Vag-Spont None Y Living 7 7   Name: Mike Aguilar   Location: Other   Delivering Clinician: José Miguel Quiñones MD      Prenatal History     Most Recent Value   Did You Receive Prenatal Care Yes   Name Of OB Provider Marybeth   Seen By MFM (Maternal Fetal Medicine)? Yes   Fetal Ultrasound Abnormalities/Concerns?  Yes   Infant Feeding Breast Milk   Circumcision Planned Yes   Pediatrician After Birth/ Follow Up Baby Visits PS   Dating Summary    Working NORBERT: 21 set by Trace Khan RN on 05/10/21 based on Last Menstrual Period on 20   Based On NORBERT GA Diff GA User Date   Last Menstrual Period on 20 (Exact Date) 21 Working  Georgia Hummel RN 05/10/21   Prenatal Results    Prenatal Labs    Test Value Date Time   ABO/Rh      Antibody Scrn      Hgb      Hct      Platelets      Rubella      RPR      T. Pallidum Antibody      Urine      Hep B Surf Ag      Hep C      HIV      Gonorrhea      Chlamydia      TSH      GTT, 1 HR (Glucola)      GTT, Fasting      GTT, 1 HR      GTT, 2 HR      GTT, 3 HR      3rd Trimester    Test Value Date Time   Hgb      Hct      Platelets      Group B Strep      Antibody Scrn      TSH      T. Pallidum Antibody      Hep B Surf Ag      Gonorrhea      Chlamydia       Screening/Diagnostics    Test Value Date Time   AFP Only      AFP Tetra      Hgb Electrophoresis      Amniocentesis      Cystic Fibrosis      Thalessemia      Joo-Sachs      Canavan      PAP Smear      Beta HCG      NT      NIPT      COVID-19      Lab    Test Value Date Time   GTT, Fasting      GTT, 1HR      GTT, 2HR      GTT, 3HR      RPR * Non-Reactive  10/11/19    Beta HCG      CMV Ab      Toxoplasma Ab      Varicella Zoster Ab         Legend    *: Historical  View all results for this pregnancy   APPLE Kasper [[id number]]     Delivery    Birth date/time: 2020 02:49:18  Delivery type: Vaginal, Spontaneous  Labor Event Times    Labor onset date/time: 2021  Dilation complete date/time: 2021  Start pushing date/time: 2021 0243  Labor Events     labor?: Yes   steroids?: None  Cervical ripening type: None  Antibiotics during labor?: Yes  Sac identifier: Sac 1  Rupture date/time: 2021  Rupture type: SROM  Fluid color: Clear  Fluid odor: None  Induction: None  Augmentation: None  Labor/Delivery complications: None  Delivery Providers    Delivering clinician: Massimo Sandoval MD  Provider Role   Massimo Sandoval MD Obstetrician   Gina López, JAYMIE Primary Nurse   Felicitas Lombard Charge Nurse   Mi Skinner MD Neonatologist   Korina Rangel Respiratory Therapist   Elvira Alexandreball Scrub Tech   Anesthesia    Method: None  Multiple Birth Onset Second Stage    Second Stage Start Date: 21 Second Stage Start Time: 211   Operative Delivery    Forceps attempted?: No  Vacuum extractor attempted?: No  Presentation    Presentation: Vertex  Apgars    Living status: Living  Apgars:  1 min. :  5 min.:  10 min. :  15 min.:  20 min.:    Skin color:  0  0       Heart rate:  2  2       Reflex irritability:  2  2       Muscle tone:  2  2       Respiratory effort:  1  1       Total:  7  7       Apgars assigned by: DR. MENESES  Resuscitation    Method: Suctioning-bulb, Tactile Stimulation  Shoulder Dystocia    Shoulder dystocia present?: No  Measurements    Weight: 2720 g  Weight (lbs): 5 lb 15.9 oz  Length: 48.5 cm  Length (in): 19.09\"  Head circumference: 31.5 cm  Chest circumference: 31.5 cm  Lacerations    Lacerations: 1st  Repair Needed:   # of Repair Packets:   Suture Type and Size:   Suture Comment:   Estimated Blood Loss (mL):     Placenta    Placenta Date/Time: 2021 025  Removal: Spontaneous  Appearance: Normal Placenta disposition: Discarded   Vaginal Counts    Initial count personnel: NASRA JIMENEZ/ZHOU COON  Final count personnel: Mary Lou JIMENEZ/ZHOU COON  Accurate final count?: Yes  Delivery Summary History    Event User Date/Time   Signed Estell Brunner, RN 7/31/2021 07:11:30